# Patient Record
Sex: FEMALE | Race: WHITE
[De-identification: names, ages, dates, MRNs, and addresses within clinical notes are randomized per-mention and may not be internally consistent; named-entity substitution may affect disease eponyms.]

---

## 2022-08-16 ENCOUNTER — HOSPITAL ENCOUNTER (OUTPATIENT)
Dept: HOSPITAL 19 - SDCO | Age: 72
Setting detail: OBSERVATION
LOS: 1 days | Discharge: HOME | End: 2022-08-17
Attending: SURGERY | Admitting: SURGERY
Payer: MEDICARE

## 2022-08-16 VITALS — TEMPERATURE: 97.8 F | HEART RATE: 74 BPM | DIASTOLIC BLOOD PRESSURE: 81 MMHG | SYSTOLIC BLOOD PRESSURE: 151 MMHG

## 2022-08-16 VITALS — TEMPERATURE: 98.1 F | DIASTOLIC BLOOD PRESSURE: 64 MMHG | SYSTOLIC BLOOD PRESSURE: 142 MMHG | HEART RATE: 62 BPM

## 2022-08-16 VITALS — DIASTOLIC BLOOD PRESSURE: 72 MMHG | HEART RATE: 64 BPM | SYSTOLIC BLOOD PRESSURE: 152 MMHG | TEMPERATURE: 97.8 F

## 2022-08-16 VITALS — SYSTOLIC BLOOD PRESSURE: 152 MMHG | HEART RATE: 72 BPM | TEMPERATURE: 97.8 F | DIASTOLIC BLOOD PRESSURE: 73 MMHG

## 2022-08-16 VITALS — SYSTOLIC BLOOD PRESSURE: 152 MMHG | TEMPERATURE: 97.8 F | DIASTOLIC BLOOD PRESSURE: 82 MMHG | HEART RATE: 75 BPM

## 2022-08-16 VITALS — TEMPERATURE: 97.8 F | DIASTOLIC BLOOD PRESSURE: 77 MMHG | SYSTOLIC BLOOD PRESSURE: 148 MMHG | HEART RATE: 71 BPM

## 2022-08-16 VITALS — SYSTOLIC BLOOD PRESSURE: 151 MMHG | DIASTOLIC BLOOD PRESSURE: 69 MMHG | TEMPERATURE: 97.8 F | HEART RATE: 71 BPM

## 2022-08-16 VITALS — TEMPERATURE: 97.8 F | DIASTOLIC BLOOD PRESSURE: 70 MMHG | HEART RATE: 70 BPM | SYSTOLIC BLOOD PRESSURE: 146 MMHG

## 2022-08-16 VITALS — SYSTOLIC BLOOD PRESSURE: 148 MMHG | TEMPERATURE: 98.2 F | HEART RATE: 68 BPM | DIASTOLIC BLOOD PRESSURE: 75 MMHG

## 2022-08-16 VITALS — TEMPERATURE: 97.1 F | DIASTOLIC BLOOD PRESSURE: 74 MMHG | SYSTOLIC BLOOD PRESSURE: 161 MMHG | HEART RATE: 56 BPM

## 2022-08-16 VITALS — HEIGHT: 62.99 IN | BODY MASS INDEX: 31.84 KG/M2 | WEIGHT: 179.68 LBS

## 2022-08-16 VITALS — HEART RATE: 67 BPM | SYSTOLIC BLOOD PRESSURE: 166 MMHG | TEMPERATURE: 97.8 F | DIASTOLIC BLOOD PRESSURE: 77 MMHG

## 2022-08-16 DIAGNOSIS — K44.9: Primary | ICD-10-CM

## 2022-08-16 DIAGNOSIS — K80.20: ICD-10-CM

## 2022-08-16 DIAGNOSIS — R10.12: ICD-10-CM

## 2022-08-16 PROCEDURE — C1781 MESH (IMPLANTABLE): HCPCS

## 2022-08-16 PROCEDURE — G0378 HOSPITAL OBSERVATION PER HR: HCPCS

## 2022-08-16 NOTE — NUR
PT AWAKE, HAS TAKEN CLEAR LIQUIDS WITHOUT PROBLEM. HAS LAP SITES X5 TO ABD. HS
MEDS GIVEN INCLUDING NORCO 1 TAB PO FOR PAIN. HAS VOIDED X1.

## 2022-08-16 NOTE — NUR
PATIENT IN AND OUT OF SLEEP. UNABLE TO KEEP HER EYES OPEN LONGER THAN A FEW
SECONDS. SHE IS COMPLAING OF SHOULDER PAIN. INFORMED PATIENT AS SOON AS SHE IS
ABLE TO STAY AWAKE AND WALK IT WILL HELP GET RID OF THE GAS INSIDE AND
ALLEVIATE HER SHOULDER PAIN. CURRENTLY STABLE IN RESTING IN BED.

## 2022-08-17 VITALS — DIASTOLIC BLOOD PRESSURE: 76 MMHG | SYSTOLIC BLOOD PRESSURE: 145 MMHG | HEART RATE: 58 BPM | TEMPERATURE: 98.5 F

## 2022-08-17 VITALS — SYSTOLIC BLOOD PRESSURE: 155 MMHG | DIASTOLIC BLOOD PRESSURE: 68 MMHG | HEART RATE: 56 BPM | TEMPERATURE: 98.1 F

## 2022-08-17 VITALS — HEART RATE: 62 BPM | TEMPERATURE: 98.1 F | SYSTOLIC BLOOD PRESSURE: 150 MMHG | DIASTOLIC BLOOD PRESSURE: 68 MMHG

## 2022-08-17 NOTE — NUR
Initial visit; Patient thanked  for looking in on her and visiting and
offering a [place].  listened and also wished [place] a good
recovery.

## 2022-08-17 NOTE — NUR
met with patient to discuss discharge planning. Patient lives
alone in [place] and sees MANDI Chiang for primary care. Patient
obtains medications mostly by mail, but also uses [facility]
in  as needed. Patient does not use DME and is independent with [facility].
Patient reports she has a dog, kitten, calf, and four chickens at home that
she cares for. Patient does not have Advance Directives but plans to discuss
this with her . Patient is not interested in completing [facility]PO[facility] during
this hospital stay. Patient advised she is supported by her children who live
nearby including her daughter, Lali (ph#[phone]) who is listed as
patient's emergency contact.
 
Discharge Plan: Home